# Patient Record
Sex: FEMALE | Race: WHITE | Employment: OTHER | URBAN - METROPOLITAN AREA
[De-identification: names, ages, dates, MRNs, and addresses within clinical notes are randomized per-mention and may not be internally consistent; named-entity substitution may affect disease eponyms.]

---

## 2023-12-23 ENCOUNTER — HOSPITAL ENCOUNTER (OUTPATIENT)
Age: 63
Discharge: HOME | End: 2023-12-23
Payer: MEDICAID

## 2023-12-23 VITALS
HEART RATE: 58 BPM | RESPIRATION RATE: 20 BRPM | TEMPERATURE: 98 F | SYSTOLIC BLOOD PRESSURE: 132 MMHG | OXYGEN SATURATION: 97 % | DIASTOLIC BLOOD PRESSURE: 67 MMHG

## 2023-12-23 DIAGNOSIS — B34.9 VIRAL ILLNESS: Primary | ICD-10-CM

## 2023-12-23 LAB — S PYO AG THROAT QL: NEGATIVE

## 2023-12-23 RX ORDER — FLUTICASONE PROPIONATE 50 MCG
2 SPRAY, SUSPENSION (ML) NASAL DAILY
Qty: 16 G | Refills: 0 | Status: SHIPPED | OUTPATIENT
Start: 2023-12-23

## 2023-12-23 RX ORDER — CODEINE PHOSPHATE AND GUAIFENESIN 10; 100 MG/5ML; MG/5ML
5 SOLUTION ORAL EVERY 6 HOURS PRN
Qty: 118 ML | Refills: 0 | Status: SHIPPED | OUTPATIENT
Start: 2023-12-23

## 2023-12-23 NOTE — DISCHARGE INSTRUCTIONS
Take over-the-counter Tylenol or ibuprofen for any throat discomfort take the cough medication as prescribed will cause drowsiness no driving or operating machinery while taking this medication. Drink plenty of fluids warm tea with honey. Follow-up with your primary care provider in 3 to 4 days. If you develop a fever chest pain shortness of breath nausea vomiting diarrhea or any new or worsening symptoms go to the nearest emergency department.